# Patient Record
Sex: MALE | Race: BLACK OR AFRICAN AMERICAN | Employment: OTHER | URBAN - METROPOLITAN AREA
[De-identification: names, ages, dates, MRNs, and addresses within clinical notes are randomized per-mention and may not be internally consistent; named-entity substitution may affect disease eponyms.]

---

## 2018-08-01 ENCOUNTER — OFFICE VISIT (OUTPATIENT)
Dept: ORTHOPEDIC SURGERY | Age: 58
End: 2018-08-01

## 2018-08-01 VITALS
HEIGHT: 71 IN | HEART RATE: 71 BPM | BODY MASS INDEX: 26.6 KG/M2 | DIASTOLIC BLOOD PRESSURE: 87 MMHG | WEIGHT: 190 LBS | SYSTOLIC BLOOD PRESSURE: 149 MMHG

## 2018-08-01 DIAGNOSIS — S82.832A OTHER CLOSED FRACTURE OF PROXIMAL END OF LEFT FIBULA, INITIAL ENCOUNTER: Primary | ICD-10-CM

## 2018-08-01 DIAGNOSIS — S82.832A OTHER CLOSED FRACTURE OF DISTAL END OF LEFT FIBULA, INITIAL ENCOUNTER: ICD-10-CM

## 2018-08-01 PROCEDURE — 99202 OFFICE O/P NEW SF 15 MIN: CPT | Performed by: ORTHOPAEDIC SURGERY

## 2018-08-01 PROCEDURE — L4361 PNEUMA/VAC WALK BOOT PRE OTS: HCPCS | Performed by: ORTHOPAEDIC SURGERY

## 2018-08-01 PROCEDURE — APPNB15 APP NON BILLABLE TIME 0-15 MINS: Performed by: ORTHOPAEDIC SURGERY

## 2018-08-01 NOTE — PROGRESS NOTES
visit. Allergies, social and family histories were reviewed and updated as appropriate. Review of Systems:  Relevant review of systems reviewed and available in the patient's chart under the 'MEDIA' tab. Vital Signs:  BP (!) 149/87   Pulse 71   Ht 5' 11\" (1.803 m)   Wt 190 lb (86.2 kg)   BMI 26.50 kg/m²     General Exam:   Constitutional: Patient is adequately groomed with no evidence of malnutrition  Mental Status: The patient is oriented to time, place and person. The patient's mood and affect are appropriate. Lymphatic: The lymphatic examination bilaterally reveals all areas to be without enlargement or induration. Neurological: The patient has good coordination and balance. There is no focal weakness or sensory deficit. Left Lower Leg Examination:    Inspection: Normal ankle and foot alignment. There is no obvious swelling or joint effusion of the knee or ankle. Normal muscle contours and no significant limb length discrepancy. No gross atrophy in any particular myotome. There are no abrasions, lacerations, contusions, hematomas or ecchymosis. There is no erythema, induration or warmth to suggest an infectious process. Patient does have a very mild lower extremity edema bilaterally. Palpation:  Patient has tenderness on palpation of the proximal and distal ends of the fibula. He denies tenderness on palpation of the tibia. Range of Motion:    Left Ankle:     Plantarflexion: 40°    Dorsiflexion: 15°    Inversion: 20°    Eversion: 15°      Left knee:     Flexion: 140°    Extension: 0°    Special Tests:   Anterior Drawer (ACL) - negative   Posterior Drawer (PCL) - negative   Thompon's Test (Achilles rupture) - negative   Anterior Drawer Test (ATFL sprain) - negative   Talar Tilt Test Inversion(CFL tear) - negative    Skin: There are no rashes, ulcerations or lesions. Sensation to light touch intact. Circulation: The limb is warm and well perfused. Distal pulses intact.  Capillary refill is intact. Edema: mild. Venous stasis changes: none. Gait: Did not ambulate patient. He has MS and normally uses a walker. He is here in a wheelchair today. Radiology:     X-rays obtained  in the emergency department on 7/27/2018 and reviewed in office today:    Views: 2 view left tibia and fibula and 3 view left ankle including AP, lateral and oblique   FINDINGS:   There is a oblique fracture of the distal fibula, extending from the the   level of the ankle mortise, 4 cm superiorly.  The ankle mortise is   symmetrical.       There is a nondisplaced fracture of the fibular neck.  The tibia is intact.       Soft tissue calcifications are noted in the medial and lateral aspects of the   lower leg.  These are consistent with previous inflammation or trauma. Orders:  Orders Placed This Encounter   Procedures    Breg Tall Genisus Walking Boot     Patient was prescribed a Breg Tall Genisus Walking Boot. The left ankle will require stabilization / immobilization from this semi-rigid / rigid orthosis to improve their function. The orthosis will assist in protecting the affected area, provide functional support and facilitate healing. Patient was instructed to progress ambulation weight bearing as tolerated in the device. The patient was educated and fit by a healthcare professional with expert knowledge and specialization in brace application while under the direct supervision of the physician. Verbal and written instructions for the use of and application of this item were provided. They were instructed to contact the office immediately should the brace result in increased pain, decreased sensation, increased swelling or worsening of the condition. Impression:  Encounter Diagnoses   Name Primary?     Other closed fracture of proximal end of left fibula, initial encounter Yes    Other closed fracture of distal end of left fibula, initial encounter        Treatment Plan: Patient has a history of MS and had a mechanical fall on 7/27/2018 which resulted in nondisplaced fractures of the proximal and distal left fibula. He rates his pain a 0/10 at this time. There is minimal swelling. He does have mild chronic bilateral lower extremity edema and wears INOCENCIO stockings. He normally uses a walker to ambulate. He is fitted into a walking boot at today's visit. He is advised to take it easy and continue elevating and icing. He can weight-bear as tolerated. He will follow-up in 3 weeks at which time we will repeat imaging of his left tibia and fibula. He will return before that time with any concerns. Suzie Garcia was informed of the results of any imaging. We discussed treatment options and a time was given to answer questions. A plan was proposed and Suzie Garcia understand and accepts this course of care. During this examination, SAUL Paige PA-C, functioned as a scribe for Dr. Deanne Glover. The history taking and physical examination were also performed by Dr. Deanne Glover. Please note that portions of this note were completed with a voice recognition program.  Efforts were made to edit the dictations but occasionally words are mis-transcribed.

## 2018-08-01 NOTE — LETTER
[address] [facility]  [address]  [address] [facility],[address] [address]  Phone: [phone]  Fax: [phone]    No ref. provider found        August 1, 2018       Patient: Morris Rod   MR Number: [id number]   YOB: 1960   Date of Visit: 8/1/2018       Dear Dr. Shayna Reese ref. provider found: Thank you for the request for consultation for Morris Rod to me for the evaluation of left fibula fracture. Below are the relevant portions of my assessment and plan of care. If you have questions, please do not hesitate to call me. I look forward to following Gonzalez Kumar along with you.     Sincerely,        Debbie Alex MD     providers:  Angelito Newman MD  VIA Facsimile: [phone]

## 2018-08-03 ENCOUNTER — TELEPHONE (OUTPATIENT)
Dept: ORTHOPEDIC SURGERY | Age: 58
End: 2018-08-03

## 2018-08-03 NOTE — TELEPHONE ENCOUNTER
8/2/18   [facility]  - NO PRECERT REQUIRED FOR PARTICIPATING PROVIDERS - PER RECORDING - REF # [id number] -  NDS

## 2018-08-29 ENCOUNTER — TELEPHONE (OUTPATIENT)
Dept: RHEUMATOLOGY | Age: 58
End: 2018-08-29

## 2018-09-14 ENCOUNTER — OFFICE VISIT (OUTPATIENT)
Dept: ORTHOPEDIC SURGERY | Age: 58
End: 2018-09-14

## 2018-09-14 VITALS — BODY MASS INDEX: 26.6 KG/M2 | WEIGHT: 190 LBS | HEIGHT: 71 IN

## 2018-09-14 DIAGNOSIS — S82.832D OTHER CLOSED FRACTURE OF PROXIMAL END OF LEFT FIBULA WITH ROUTINE HEALING, SUBSEQUENT ENCOUNTER: ICD-10-CM

## 2018-09-14 DIAGNOSIS — S82.832D OTHER CLOSED FRACTURE OF DISTAL END OF LEFT FIBULA WITH ROUTINE HEALING, SUBSEQUENT ENCOUNTER: Primary | ICD-10-CM

## 2018-09-14 PROCEDURE — 99213 OFFICE O/P EST LOW 20 MIN: CPT | Performed by: PHYSICIAN ASSISTANT

## 2018-09-14 NOTE — PROGRESS NOTES
Patient Name: Angelica Smiley  Medical Record Number: [id number]  YOB: 1960  Date of Encounter: 9/14/2018     Chief Complaint   Patient presents with    Follow-up     Left ankle/tib-fib Fx; doi 7/27/18. History of Present Illness:   Mr. Angelica Smiley is here in 7 week follow up regarding his left proximal and distal fibula fractures. He had a mechanical fall on 7/27/2018. He does have multiple sclerosis. He normally uses a walker to ambulate. He was fitted into a walking boot during his initial visit on 8/1/2018. He states he does occasionally wear the walking boot. He is here today with a normal lace-up shoe. He denies having any pain or swelling. He denies numbness or tingling in the left leg. The patient's past medical history, medications, allergies, family history, social history, and review of systems have been reviewed, and dated and are recorded in the chart under the 'MEDIA\" tab. Physical Exam:    Mr. Angelica Smiley appears well, he is in no apparent distress, he demonstrates appropriate mood & affect. He is alert and oriented to person, place and time. Ht 5' 11\" (1.803 m)   Wt 190 lb (86.2 kg)   BMI 26.50 kg/m²     On examination of patient's left lower leg there is no swelling or joint effusion of the knee or ankle. He denies tenderness on palpation of the proximal or distal fibula. He has great range of motion of both the knee and the ankle without pain. He has 2+ distal pulses. He denies sensory deficits. There is no edema or erythema in the affected extremity. There are no signs/symptoms of DVT/PE or infection    Radiology:  X-rays obtained and reviewed in office:   Views: 2 view left tibia/fibula including AP and lateral  Impression: Patient has a healing fracture of the fibular neck. There is no displacement. Views: 3 view left ankle including AP, lateral and oblique  Impression: Patient has a healing oblique fracture of the distal fibula.   Ankle mortise

## 2018-10-12 ENCOUNTER — OFFICE VISIT (OUTPATIENT)
Dept: ORTHOPEDIC SURGERY | Age: 58
End: 2018-10-12
Payer: MEDICARE

## 2018-10-12 VITALS
WEIGHT: 190 LBS | DIASTOLIC BLOOD PRESSURE: 86 MMHG | SYSTOLIC BLOOD PRESSURE: 126 MMHG | HEIGHT: 71 IN | HEART RATE: 71 BPM | BODY MASS INDEX: 26.6 KG/M2

## 2018-10-12 DIAGNOSIS — S82.832D OTHER CLOSED FRACTURE OF DISTAL END OF LEFT FIBULA WITH ROUTINE HEALING, SUBSEQUENT ENCOUNTER: Primary | ICD-10-CM

## 2018-10-12 DIAGNOSIS — S82.832D OTHER CLOSED FRACTURE OF PROXIMAL END OF LEFT FIBULA WITH ROUTINE HEALING, SUBSEQUENT ENCOUNTER: ICD-10-CM

## 2018-10-12 PROCEDURE — 99213 OFFICE O/P EST LOW 20 MIN: CPT | Performed by: PHYSICIAN ASSISTANT

## 2018-10-12 NOTE — PROGRESS NOTES
with routine healing, subsequent encounter  XR TIBIA FIBULA LEFT (2 VIEWS)       Treatment Plan:    Patient is doing well 11 weeks postop. He no longer needs to wear his walking boot. He can use regular lace up tennis shoes. He will be very cautious against falling. He will use his walker as needed. He will follow up in this office as needed. Bill Turnerashley was informed of the results of any imaging. We discussed treatment options and a time was given to answer questions. A plan was proposed and Bill Herbert understand and accepts this course of care. Electronically signed by Lorena Whiting PA-C on 60/96/1613  Board Certified [facility]    Please note that portions of this note were completed with a voice recognition program.  Efforts were made to edit the dictations but occasionally words are mis-transcribed.

## 2019-09-23 ENCOUNTER — HOSPITAL ENCOUNTER (EMERGENCY)
Age: 59
Discharge: HOME | End: 2019-09-23
Payer: MEDICARE

## 2019-09-23 VITALS
DIASTOLIC BLOOD PRESSURE: 86 MMHG | HEART RATE: 83 BPM | BODY MASS INDEX: 28 KG/M2 | WEIGHT: 200 LBS | HEIGHT: 71 IN | TEMPERATURE: 97.7 F | RESPIRATION RATE: 16 BRPM | OXYGEN SATURATION: 96 % | SYSTOLIC BLOOD PRESSURE: 156 MMHG

## 2019-09-23 DIAGNOSIS — S01.81XA FACIAL LACERATION, INITIAL ENCOUNTER: Primary | ICD-10-CM

## 2019-09-23 DIAGNOSIS — Z23 NEED FOR TETANUS BOOSTER: ICD-10-CM

## 2019-09-23 PROCEDURE — 6360000002 HC RX W HCPCS: Performed by: PHYSICIAN ASSISTANT

## 2019-09-23 PROCEDURE — 4500000021 HC ED LEVEL 1 PROCEDURE

## 2019-09-23 PROCEDURE — 90715 TDAP VACCINE 7 YRS/> IM: CPT | Performed by: PHYSICIAN ASSISTANT

## 2019-09-23 PROCEDURE — 99282 EMERGENCY DEPT VISIT SF MDM: CPT

## 2019-09-23 PROCEDURE — 90471 IMMUNIZATION ADMIN: CPT | Performed by: PHYSICIAN ASSISTANT

## 2019-09-23 RX ADMIN — TETANUS TOXOID, REDUCED DIPHTHERIA TOXOID AND ACELLULAR PERTUSSIS VACCINE, ADSORBED 0.5 ML: 5; 2.5; 8; 8; 2.5 SUSPENSION INTRAMUSCULAR at 21:48

## 2019-09-23 ASSESSMENT — ENCOUNTER SYMPTOMS
VOMITING: 0
ABDOMINAL PAIN: 0
SHORTNESS OF BREATH: 0
NAUSEA: 0

## 2019-09-24 NOTE — ED PROVIDER NOTES
[facility]        Pt Name: Layne Damon  MRN: [id number]  [place] 1960  Date of evaluation: 9/23/2019  Provider: Yuan Puckett PA-C  PCP: Michelle Cash    This patient was not seen and evaluated by the attending physician No att. providers found. CHIEF COMPLAINT       Chief Complaint   Patient presents with    Laceration     Lac on face. Hit face on metal.  Denies LOC. HISTORY OF PRESENT ILLNESS   (Location/Symptom, Timing/Onset, Context/Setting, Quality, Duration, Modifying Factors, Severity)  Note limiting factors. Layne Damon is a 62 y.o. male patient states he cut himself on a piece of aluminum. He has multiple lacerations to the right side of his face. He denies any loss of consciousness. He denies recent tetanus vaccination. He denies any other injuries at this time      Nursing Notes were all reviewed and agreed with or any disagreements were addressed  in the HPI. REVIEW OF SYSTEMS    (2-9 systems for level 4, 10 or more for level 5)     Review of Systems   Constitutional: Negative for fatigue and fever. Eyes: Negative for visual disturbance. Respiratory: Negative for shortness of breath. Cardiovascular: Negative for chest pain. Gastrointestinal: Negative for abdominal pain, nausea and vomiting. Genitourinary: Negative. Musculoskeletal: Negative. Skin: Positive for wound. Neurological: Negative. Positives and Pertinent negatives as per HPI. Except as noted abovein the ROS, all other systems were reviewed and negative.        PAST MEDICAL HISTORY     Past Medical History:   Diagnosis Date    Asthma     Hypertension     Multiple sclerosis ([facility] [address]          SURGICAL HISTORY     Past Surgical History:   Procedure Laterality Date    TOE SURGERY      in web space between 2nd and 3rd toes on right foot         CURRENTMEDICATIONS       Discharge Medication List as of results found. PROCEDURES   Unless otherwise noted below, none     Lac Repair  Date/Time: 9/23/2019 10:57 PM  Performed by: Raguel Hodgkins, PA-C  Authorized by: Raguel Hodgkins, PA-C     Anesthesia (see MAR for exact dosages): Anesthesia method:  Local infiltration    Local anesthetic:  Lidocaine 1% w/o epi  Laceration details:     Location:  Face    Face location:  R eyebrow    Length (cm):  1  Repair type:     Repair type:  Simple  Exploration:     Hemostasis achieved with:  Direct pressure  Treatment:     Area cleansed with:  Hibiclens and saline    Amount of cleaning:  Standard    Irrigation solution:  Sterile saline  Skin repair:     Repair method:  Sutures    Suture size:  6-0    Suture material:  Prolene    Suture technique:  Simple interrupted    Number of sutures:  2  Approximation:     Approximation:  Close  Post-procedure details:     Dressing:  Open (no dressing)    Patient tolerance of procedure: Tolerated well, no immediate complications  Lac Repair  Date/Time: 9/23/2019 10:58 PM  Performed by: Raguel Hodgkins, PA-C  Authorized by: Raguel Hodgkins, PA-C     Laceration details:     Location:  Face    Face location:  R cheek    Length (cm):  2  Repair type:     Repair type:  Simple  Exploration:     Hemostasis achieved with:  Direct pressure  Treatment:     Area cleansed with:  Hibiclens and saline    Amount of cleaning:  Standard    Irrigation solution:  Sterile saline  Skin repair:     Repair method:  Steri-Strips    Number of Steri-Strips:  3  Approximation:     Approximation:  Close  Post-procedure details:     Dressing:  Open (no dressing)    Patient tolerance of procedure:   Tolerated well, no immediate complications        CRITICAL CARE TIME   N/A    CONSULTS:  None      EMERGENCY DEPARTMENT COURSE and DIFFERENTIAL DIAGNOSIS/MDM:   Vitals:    Vitals:    09/23/19 2012 09/23/19 2015   BP:  (!) 156/86   Pulse:  83   Resp: 16    Temp:  97.7 °F (36.5 °C)   TempSrc:  Infrared   SpO2: that portions ofthis note were completed with a voice recognition program.  Efforts were made to edit the dictations but occasionally words are mis-transcribed.)    Shama Ashton PA-C (electronically signed)           Shama Ashton PA-C  09/23/19 [id number]

## 2019-10-07 ENCOUNTER — HOSPITAL ENCOUNTER (EMERGENCY)
Age: 59
Discharge: HOME | End: 2019-10-07
Attending: EMERGENCY MEDICINE
Payer: MEDICARE

## 2019-10-07 VITALS
OXYGEN SATURATION: 97 % | DIASTOLIC BLOOD PRESSURE: 80 MMHG | BODY MASS INDEX: 28 KG/M2 | HEART RATE: 80 BPM | TEMPERATURE: 97.9 F | HEIGHT: 71 IN | WEIGHT: 200 LBS | RESPIRATION RATE: 18 BRPM | SYSTOLIC BLOOD PRESSURE: 149 MMHG

## 2019-10-07 DIAGNOSIS — Z48.02 VISIT FOR SUTURE REMOVAL: Primary | ICD-10-CM

## 2019-10-07 PROCEDURE — 99281 EMR DPT VST MAYX REQ PHY/QHP: CPT

## 2023-03-30 ENCOUNTER — HOSPITAL ENCOUNTER (EMERGENCY)
Age: 63
Discharge: HOME | End: 2023-03-30
Payer: MEDICARE

## 2023-03-30 VITALS
SYSTOLIC BLOOD PRESSURE: 172 MMHG | RESPIRATION RATE: 18 BRPM | HEART RATE: 86 BPM | DIASTOLIC BLOOD PRESSURE: 96 MMHG | OXYGEN SATURATION: 97 % | TEMPERATURE: 97.9 F

## 2023-03-30 DIAGNOSIS — S01.112A LEFT EYELID LACERATION, INITIAL ENCOUNTER: Primary | ICD-10-CM

## 2023-03-30 DIAGNOSIS — R03.0 ELEVATED BLOOD PRESSURE READING: ICD-10-CM
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
## 2023-03-30 PROCEDURE — 99282 EMERGENCY DEPT VISIT SF MDM: CPT

## 2023-03-30 PROCEDURE — 12011 RPR F/E/E/N/L/M 2.5 CM/<: CPT

## 2023-03-30 ASSESSMENT — ENCOUNTER SYMPTOMS
ABDOMINAL PAIN: 0
SHORTNESS OF BREATH: 0
DIARRHEA: 0
RHINORRHEA: 0
NAUSEA: 0
EYE PAIN: 0
COUGH: 0
WHEEZING: 0
VOMITING: 0

## 2023-03-30 ASSESSMENT — LIFESTYLE VARIABLES
HOW OFTEN DO YOU HAVE A DRINK CONTAINING ALCOHOL: NEVER
HOW MANY STANDARD DRINKS CONTAINING ALCOHOL DO YOU HAVE ON A TYPICAL DAY: PATIENT DOES NOT DRINK

## 2023-03-31 NOTE — ED PROVIDER NOTES
[facility]        Pt Name: Ciro Linares  MRN: [id number]  [place] 1960  Date of evaluation: 3/30/2023  Provider: Daniel Moulton PA-C  PCP: Nanette Salazar  Note Started: 8:59 PM EDT 3/30/23      ROXANA. I have evaluated this patient. My supervising physician was available for consultation. CHIEF COMPLAINT       Chief Complaint   Patient presents with    Facial Laceration     Pt arrives in the ED with c/o facial laceration. Pt hand slipped of the dresser and he hit his face . Pt not on any blood thinners       HISTORY OF PRESENT ILLNESS: 1 or more Elements     History From: patient   Limitations to history : None    Ciro Linares is a 57 y.o. male who presents for evaluation of a left eyelid laceration. Patient states that he leaned his hand against a fridge, hand slipped and he fell forward, hitting his head on the refrigerator. He denies any loss of consciousness. He is not anticoagulated. Denies any visual disturbances. No dizziness/lightheadedness, weakness. Bleeding is controlled. Tetanus is up-to-date. He has no other injuries or complaints at this time. Nursing Notes were all reviewed and agreed with or any disagreements were addressed in the HPI. REVIEW OF SYSTEMS :      Review of Systems   Constitutional:  Negative for appetite change, chills and fever. HENT:  Negative for congestion and rhinorrhea. Eyes:  Negative for pain and visual disturbance. Respiratory:  Negative for cough, shortness of breath and wheezing. Cardiovascular:  Negative for chest pain. Gastrointestinal:  Negative for abdominal pain, diarrhea, nausea and vomiting. Genitourinary:  Negative for difficulty urinating, dysuria and hematuria. Musculoskeletal:  Negative for neck pain and neck stiffness. Skin:  Positive for wound. Negative for rash.    Neurological:  Negative for dizziness, syncope, weakness, light-headedness supportive and wound care discussed including follow-up for recheck within 1 week. I estimate there is LOW risk for SKULL FRACTURE, INTRACRANIAL HEMORRHAGE, CERVICAL SPINE INJURY, SUBDURAL OR EPIDURAL HEMATOMA,     I completed a structured, evidence-based clinical evaluation to screen for neurologic deficits in this patient. The patient has a normal detailed neurologic exam    I feel the patient can be safely discharged to home with outpatient follow up. Instructions have been given for the patient to return if there is any significant worsening of the headache or the development of confusion, vision change, weakness, numbness, difficulty with speech or walking, or any other concerns. He is agreeable to this plan and stable for discharge at this time. I am the Primary Clinician of Record. FINAL IMPRESSION      1. Left eyelid laceration, initial encounter    2. Fall, initial encounter    3.  Elevated blood pressure reading          DISPOSITION/PLAN     DISPOSITION Decision To Discharge 03/30/2023 09:07:15 PM      PATIENT REFERRED TO:  Melany Elizondo  [phone]    Call   For a re-check in  5-7   days    [facility]  [place] [address]  [phone]  Go to   If symptoms worsen    DISCHARGE MEDICATIONS:  New Prescriptions    No medications on file       DISCONTINUED MEDICATIONS:  Discontinued Medications    No medications on file              (Please note that portions of this note were completed with a voice recognition program.  Efforts were made to edit the dictations but occasionally words are mis-transcribed.)    Elzbieta Mota PA-C (electronically signed)            [place], [place]  03/30/23 [id number]